# Patient Record
Sex: FEMALE
[De-identification: names, ages, dates, MRNs, and addresses within clinical notes are randomized per-mention and may not be internally consistent; named-entity substitution may affect disease eponyms.]

---

## 2018-01-10 ENCOUNTER — HOSPITAL ENCOUNTER (EMERGENCY)
Dept: HOSPITAL 5 - ED | Age: 48
Discharge: HOME | End: 2018-01-10
Payer: COMMERCIAL

## 2018-01-10 VITALS — SYSTOLIC BLOOD PRESSURE: 124 MMHG | DIASTOLIC BLOOD PRESSURE: 59 MMHG

## 2018-01-10 DIAGNOSIS — J45.909: ICD-10-CM

## 2018-01-10 DIAGNOSIS — N30.01: Primary | ICD-10-CM

## 2018-01-10 DIAGNOSIS — N93.8: ICD-10-CM

## 2018-01-10 LAB
BACTERIA #/AREA URNS HPF: (no result) /HPF
BASOPHILS # (AUTO): 0 K/MM3 (ref 0–0.1)
BASOPHILS NFR BLD AUTO: 0.5 % (ref 0–1.8)
BILIRUB UR QL STRIP: (no result)
BLOOD UR QL VISUAL: (no result)
EOSINOPHIL # BLD AUTO: 0.1 K/MM3 (ref 0–0.4)
EOSINOPHIL NFR BLD AUTO: 1.3 % (ref 0–4.3)
HCT VFR BLD CALC: 41.1 % (ref 30.3–42.9)
HGB BLD-MCNC: 13.2 GM/DL (ref 10.1–14.3)
LYMPHOCYTES # BLD AUTO: 1.6 K/MM3 (ref 1.2–5.4)
LYMPHOCYTES NFR BLD AUTO: 19.8 % (ref 13.4–35)
MCH RBC QN AUTO: 27 PG (ref 28–32)
MCHC RBC AUTO-ENTMCNC: 32 % (ref 30–34)
MCV RBC AUTO: 84 FL (ref 79–97)
MONOCYTES # (AUTO): 0.7 K/MM3 (ref 0–0.8)
MONOCYTES % (AUTO): 8.2 % (ref 0–7.3)
NITRITE UR QL STRIP: (no result)
PH UR STRIP: 6 [PH] (ref 5–7)
PLATELET # BLD: 295 K/MM3 (ref 140–440)
RBC # BLD AUTO: 4.91 M/MM3 (ref 3.65–5.03)
RBC #/AREA URNS HPF: > 182 /HPF (ref 0–6)
UROBILINOGEN UR-MCNC: < 2 MG/DL (ref ?–2)
WBC #/AREA URNS HPF: 27 /HPF (ref 0–6)

## 2018-01-10 PROCEDURE — 81001 URINALYSIS AUTO W/SCOPE: CPT

## 2018-01-10 PROCEDURE — 96361 HYDRATE IV INFUSION ADD-ON: CPT

## 2018-01-10 PROCEDURE — 86900 BLOOD TYPING SEROLOGIC ABO: CPT

## 2018-01-10 PROCEDURE — 96374 THER/PROPH/DIAG INJ IV PUSH: CPT

## 2018-01-10 PROCEDURE — 86850 RBC ANTIBODY SCREEN: CPT

## 2018-01-10 PROCEDURE — 93005 ELECTROCARDIOGRAM TRACING: CPT

## 2018-01-10 PROCEDURE — 93010 ELECTROCARDIOGRAM REPORT: CPT

## 2018-01-10 PROCEDURE — 99283 EMERGENCY DEPT VISIT LOW MDM: CPT

## 2018-01-10 PROCEDURE — 86901 BLOOD TYPING SEROLOGIC RH(D): CPT

## 2018-01-10 PROCEDURE — 85025 COMPLETE CBC W/AUTO DIFF WBC: CPT

## 2018-01-10 PROCEDURE — 84702 CHORIONIC GONADOTROPIN TEST: CPT

## 2018-01-10 PROCEDURE — 96375 TX/PRO/DX INJ NEW DRUG ADDON: CPT

## 2018-01-10 PROCEDURE — 36415 COLL VENOUS BLD VENIPUNCTURE: CPT

## 2018-01-10 NOTE — EMERGENCY DEPARTMENT REPORT
ED General Adult HPI





- General


Chief complaint: Vaginal Bleeding


Stated complaint: ABD PAIN/VAG BLEEDING


Time Seen by Provider: 01/10/18 14:42


Source: patient


Mode of arrival: Ambulatory


Limitations: No Limitations





- History of Present Illness


Initial comments: 





Patient is a 47-year-old  female with a past medical history of 

fibroids who has been having vaginal bleeding for approximately 21 days.  

Patient is on day 12 progesterone therapy.  Patient states that she is now 

experiencing suprapubic tenderness.  Patient denies any dysuria pain is 6 out 

of 10 in severity with no radiation.  Patient denies any nausea vomiting 

diarrhea fevers chills cough at this time.


Severity scale (0 -10): 2





- Related Data


 Previous Rx's











 Medication  Instructions  Recorded  Last Taken  Type


 


HYDROcodone/APAP 5-325 [[place] 1 each PO Q4HR PRN #12 tablet 01/10/18 Unknown Rx





5/325]    


 


Nitrofurantoin Monohyd/M-Cryst 100 mg PO BID #14 capsule 01/10/18 Unknown Rx





[Macrobid 100 mg Capsule]    











 Allergies











Allergy/AdvReac Type Severity Reaction Status Date / Time


 


ofloxacin [From Floxin] Allergy  Shortness Verified 01/10/18 10:12





   of Breath  


 


Penicillins AdvReac  Unknown Verified 01/10/18 10:12


 


Sulfa (Sulfonamide AdvReac  Unknown Verified 01/10/18 10:12





Antibiotics)     














ED Review of Systems


ROS: 


Stated complaint: ABD PAIN/VAG BLEEDING


Other details as noted in HPI





Comment: All other systems reviewed and negative





ED Past Medical Hx





- Past Medical History


Hx Asthma: Yes


Additional medical history: Fibroids





- Surgical History


Past Surgical History?: No





- Social History


Smoking Status: Never Smoker


Substance Use Type: None





- Medications


Home Medications: 


 Home Medications











 Medication  Instructions  Recorded  Confirmed  Last Taken  Type


 


HYDROcodone/APAP 5-325 [[place] 1 each PO Q4HR PRN #12 tablet 01/10/18  Unknown Rx





5/325]     


 


Nitrofurantoin Monohyd/M-Cryst 100 mg PO BID #14 capsule 01/10/18  Unknown Rx





[Macrobid 100 mg Capsule]     














ED Physical Exam





- General


Limitations: No Limitations


General appearance: alert, in no apparent distress





- Head


Head exam: Present: atraumatic, normocephalic





- Eye


Eye exam: Present: normal appearance





- ENT


ENT exam: Present: mucous membranes moist





- Neck


Neck exam: Present: normal inspection





- Respiratory


Respiratory exam: Present: normal lung sounds bilaterally.  Absent: respiratory 

distress, wheezes, rales, rhonchi





- Cardiovascular


Cardiovascular Exam: Present: regular rate, normal rhythm.  Absent: systolic 

murmur, diastolic murmur, rubs, gallop





- GI/Abdominal


GI/Abdominal exam: Present: soft, tenderness (suprapubic), normal bowel sounds.

  Absent: distended, guarding, rebound





- Extremities Exam


Extremities exam: Present: normal inspection





- Back Exam


Back exam: Present: normal inspection





- Neurological Exam


Neurological exam: Present: alert, oriented X3





- Psychiatric


Psychiatric exam: Present: normal affect, normal mood





- Skin


Skin exam: Present: warm, dry, intact, normal color.  Absent: rash





ED Course





 Vital Signs











  01/10/18 01/10/18





  10:07 16:11


 


Temperature 98 F 


 


Pulse Rate 100 H 63


 


Respiratory 22 20





Rate  


 


Blood Pressure 130/90 


 


Blood Pressure  124/59





[Left]  


 


O2 Sat by Pulse 99 100





Oximetry  














ED Medical Decision Making





- Lab Data


Result diagrams: 


 01/10/18 10:30








- Medical Decision Making





Patient is a 47-year-old  female with dysfunctional uterine bleeding.  

Patient's hemoglobin was within normal limits.  Patient may have increased pain 

segmental urinary tract infection seen on urinalysis.  Patient was started on 

antibiotics.  Patient's pain in her abdomen was worsened by morphine she was 

given a GI cocktail and Pepcid.  Patient be discharged home at this time


Critical care attestation.: 


If time is entered above; I have spent that time in minutes in the direct care 

of this critically ill patient, excluding procedure time.








ED Disposition


Clinical Impression: 


 DUB (dysfunctional uterine bleeding)





Acute cystitis


Qualifiers:


 Hematuria presence: with hematuria Qualified Code(s): N30.01 - Acute cystitis 

with hematuria





Disposition: DC-01 TO HOME OR SELFCARE


Is pt being admited?: No


Does the pt Need Aspirin: No


Condition: Fair


Instructions:  Urinary Tract Infection in Women (ED), Dysfunctional Uterine 

Bleeding (ED)


Prescriptions: 


HYDROcodone/APAP 5-325 [[place] 5/325] 1 each PO Q4HR PRN #12 tablet


 PRN Reason: Pain


Nitrofurantoin Monohyd/M-Cryst [Macrobid 100 mg Capsule] 100 mg PO BID #14 

capsule


Referrals: 


BILLIE BRANHAM PA [Primary Care Provider] - 3-5 Days